# Patient Record
Sex: FEMALE | Race: WHITE | NOT HISPANIC OR LATINO | Employment: OTHER | URBAN - METROPOLITAN AREA
[De-identification: names, ages, dates, MRNs, and addresses within clinical notes are randomized per-mention and may not be internally consistent; named-entity substitution may affect disease eponyms.]

---

## 2017-05-27 ENCOUNTER — HOSPITAL ENCOUNTER (OUTPATIENT)
Dept: MRI IMAGING | Facility: CLINIC | Age: 37
Discharge: HOME | End: 2017-05-27
Attending: SPECIALIST | Admitting: SPECIALIST
Payer: COMMERCIAL

## 2017-05-27 DIAGNOSIS — E22.1 HYPERPROLACTINEMIA (H): ICD-10-CM

## 2017-05-27 PROCEDURE — 70553 MRI BRAIN STEM W/O & W/DYE: CPT

## 2017-05-27 PROCEDURE — 27210995 ZZH RX 272: Performed by: SPECIALIST

## 2017-05-27 PROCEDURE — A9585 GADOBUTROL INJECTION: HCPCS | Performed by: SPECIALIST

## 2017-05-27 PROCEDURE — 25000128 H RX IP 250 OP 636: Performed by: SPECIALIST

## 2017-05-27 RX ORDER — GADOBUTROL 604.72 MG/ML
10 INJECTION INTRAVENOUS ONCE
Status: COMPLETED | OUTPATIENT
Start: 2017-05-27 | End: 2017-05-27

## 2017-05-27 RX ORDER — ACYCLOVIR 200 MG/1
30 CAPSULE ORAL ONCE
Status: COMPLETED | OUTPATIENT
Start: 2017-05-27 | End: 2017-05-27

## 2017-05-27 RX ADMIN — GADOBUTROL 10 ML: 604.72 INJECTION INTRAVENOUS at 09:00

## 2017-05-27 RX ADMIN — SODIUM CHLORIDE 30 ML: 9 INJECTION, SOLUTION INTRAMUSCULAR; INTRAVENOUS; SUBCUTANEOUS at 09:00

## 2022-02-27 ENCOUNTER — HEALTH MAINTENANCE LETTER (OUTPATIENT)
Age: 42
End: 2022-02-27

## 2022-03-16 ENCOUNTER — TELEPHONE (OUTPATIENT)
Dept: ENDOCRINOLOGY | Facility: CLINIC | Age: 42
End: 2022-03-16
Payer: COMMERCIAL

## 2022-03-16 NOTE — TELEPHONE ENCOUNTER
Called informing pt to complete questionnaire on MyChart prior to visit.  Pt is scheduled with Dr Gordon at 2:00 pm     I called the patient and informed her that there will be a pre-visit check in 20-30 minutes prior

## 2022-03-17 ENCOUNTER — VIRTUAL VISIT (OUTPATIENT)
Dept: ENDOCRINOLOGY | Facility: CLINIC | Age: 42
End: 2022-03-17
Payer: COMMERCIAL

## 2022-03-17 VITALS — HEIGHT: 66 IN | BODY MASS INDEX: 34.87 KG/M2 | WEIGHT: 217 LBS

## 2022-03-17 DIAGNOSIS — F32.A DEPRESSION, UNSPECIFIED DEPRESSION TYPE: ICD-10-CM

## 2022-03-17 DIAGNOSIS — R63.4 WEIGHT LOSS: ICD-10-CM

## 2022-03-17 DIAGNOSIS — E66.9 OBESITY, UNSPECIFIED CLASSIFICATION, UNSPECIFIED OBESITY TYPE, UNSPECIFIED WHETHER SERIOUS COMORBIDITY PRESENT: Primary | ICD-10-CM

## 2022-03-17 PROCEDURE — 99204 OFFICE O/P NEW MOD 45 MIN: CPT | Mod: GT | Performed by: INTERNAL MEDICINE

## 2022-03-17 RX ORDER — BUPROPION HYDROCHLORIDE 150 MG/1
150 TABLET ORAL EVERY MORNING
Qty: 120 TABLET | Refills: 4 | Status: SHIPPED | OUTPATIENT
Start: 2022-03-17 | End: 2022-08-04

## 2022-03-17 RX ORDER — LEVOTHYROXINE SODIUM 50 UG/1
50 TABLET ORAL DAILY
COMMUNITY

## 2022-03-17 ASSESSMENT — PAIN SCALES - GENERAL: PAINLEVEL: NO PAIN (0)

## 2022-03-17 NOTE — PATIENT INSTRUCTIONS
A different type of gym to consider trying:  - titanium performance  - [email or web address]    Continue semaglutide 0.5mg for now    Buproprion: start 150mg per day in the morning for 2 weeks. If no noticeable benefit at that dose, then take 2 tablets (300mg) in the morning.     CONTRAVE (bupropion and naltrexone)    We are considering starting Contrave. Contrave is specifically prescribed for obesity. It is a combination of two medications, Naltrexone and Bupropione (Wellbutrin). The Bupropion helps lessen appetite and the Naltrexone works by blocking certain receptors in the brain and curbing cravings.      For some of our patients the medication works right away. Other patients don't feel much of a change but find they've lost weight. Like all weight loss medications, Contrave works best when you help it work. This means:  1. Having less tempting high calorie (fattening) food around the house or office. (For people with strong cravings this is very important.)   2. Staying away from situations or people that may trigger your cravings .   3. Eating out only one time or less each week.  4. Eating your meals at a table with the TV or computer off.    WARNING: This medication blocks the action of opioid type pain medications. If you routinely take any medication like Codeine, Oxycontin, Percocet, Morphine, Dilaudid or Methodone, do not take this until you have talked with weight management staff.     FYI- If you are planning on having an elective surgery, you should start titrating yourself off Contrave 4 weeks prior to surgery. This would entail decreasing the same way you started the medication, by taking one tablet less per week for 4 weeks, until you are able to stop the medication. If you need to stop it quicker, you can take 1 tablet in the morning and 1 tablet in the evening for 2 weeks, and then stop the medication. Please don't hesitate to call if you have any questions regarding this.    Dosing as  follows:  Week 1- 1 tablet in the morning  Week 2- 1 tablet in the morning and 1 tablet at bedtime  Week 3- 2 tablets in the morning and 1 tablet at bedtime  Week 4 and thereafter- 2 tablets in the morning and 2 tablets at bedtime    Side-effects: The most common side effect include: nausea; constipation; headache; vomiting; dizziness; diarrhea; trouble sleeping; and dry mouth.     This medication typically isn t covered by insurance and will require a prior authorization, which can take 1-2 weeks to review. Patients can visit [email or web address] and sign up for the Pharmacy savings program and receive the medication for $60-70 per month for 12 months if eligible.    For any questions or concerns please send a [employer] message to our team or call our weight management call center at [phone] during regular business hours. For questions during evenings or weekends your messages will be addressed during the next business day.  For emergencies please call 911 or seek immediate medical care.     (Do not stop taking it if you don't think it's working. For some people it works without them knowing it.)      In order to get refills of this or any medication we prescribe you must be seen in the medical weight mgmt clinic every 2-4 months. Please have your pharmacy fax a refill request to [phone].

## 2022-03-17 NOTE — NURSING NOTE
"Chief Complaint   Patient presents with     Consult      Consultation Weight Management       Vitals:    03/17/22 1353   Weight: 98.4 kg (217 lb)   Height: 1.664 m (5' 5.5\")       Body mass index is 35.56 kg/m .                       "

## 2022-03-17 NOTE — PROGRESS NOTES
Larisa is a 41 year old who is being evaluated via a billable video visit.      How would you like to obtain your AVS? MyChart  If the video visit is dropped, the invitation should be resent by: Text to cell phone: [phone]  Will anyone else be joining your video visit? No      Video Start Time: 2:14 PM  Video-Visit Details    Type of service:  Video Visit    Video End Time:3:02    Originating Location (pt. Location): Home    Distant Location (provider location):  [facility] [place]     Platform used for Video Visit: ????     [facility] Weight Management Consult    PATIENT:  Larisa Carrizales  MRN:         [id number]  :         1980  ADRIAN:         3/17/2022    Dear Ilya Mejias,    I had the pleasure of seeing your patient, Larisa Carrizales.  Full intake/assessment done to determine barriers to weight loss success and develop a treatment plan.  Larisa Carrizales is a 41 year old female interested in treatment of medical problems associated with weight.  Her weight today is 217 lbs 0 oz, Body mass index is 35.56 kg/m ., and she has the following co-morbidities:     3/16/2022   I have the following health issues associated with obesity: Hypothyroidism   I have the following symptoms associated with obesity: Depression, Fatigue     Goals for coming here: to be healthier  Weight history: had her son in  and didn't feel well after for 10 months. Found to have pituitary adenoma. Weight 170 after he was born, then steady up to 233 after that. Now down to 216 in 10 weeks.   Past experiences with weight loss: started semaglutide  In 2021 and has noticed a significant reduction in thinking about food and not hungry. Also had elevated TSH.   Typical day schedule: gets kids up at 645am.  Sleep: Goes to bed at 11:00/11:30, falls sleep at depends,   -- does always feel tired, but getting better with thyroid treatment  Snores: not really   Typical food: Breakfast:  "Cottage cheese, coffee, sometimes skips breakfast, Snack: --, Lunch: light sandwich; or salad/veggies; or out for lunch, Snack: popped chips, Dinner: chicken, veggie, salad, potatoes, eating after dinner: doesn't eat after dinner since start semaglutide.  - has fast food just once per week   - eats more unhealthy foods on the weekend  Periods of hunger during the day: Ozempic took it away  Typical snacks: yogurt  Typical beverages: coffee, water, diet coke rarely, soda   Who lives at home:  and 2 kids  Are they supportive: yes.  does time-restricted and exercises 5 days/week  Who does the shopping: both, and the cooking: both.   Mood: good  Mental Health History Reviewed With Patient 3/16/2022   Have you ever been physically or sexually abused? No   If yes, do you feel that the abuse is affecting your weight? N/A   If yes, would you like to talk to a counselor about the abuse? N/A   How often in the past 2 weeks have you felt little interest or pleasure in doing things? For Several Days   Over the past 2 weeks how often have you felt down, depressed, or hopeless? Not at all     Exercise: doesn't like going to the gym.     Patient Goals 3/16/2022   I am interested in having a healthier weight to diminish current health problems: Yes   I am interested in having a healthier weight in order to prevent future health problems: Yes   I am interested in having a healthier weight in order to have a future surgery: No       Referring Provider 3/16/2022   Please name the provider who referred you to Medical Weight Management.  If you do not know, please answer: \"I Don't Know\". Tavo Arriaga       Wt Readings from Last 4 Encounters:   03/17/22 98.4 kg (217 lb)   10/11/14 106.6 kg (235 lb 0.2 oz)   04/09/14 101.2 kg (223 lb)   02/18/14 106.1 kg (234 lb)       Weight History 3/16/2022   How concerned are you about your weight? Somewhat Concerned   Would you describe your weight gain as gradual? Yes   I became " overweight: After Pregnancy   The following factors have contributed to my weight gain:  Mental Health Issues, Eating Too Much, Lack of Exercise, Genetic (Runs in the Family)   I have tried the following methods to lose weight: Watching Portions or Calories, Exercise   My lowest weight since age 18 was: 140   My highest weight since age 18 was: 233   The most weight I have ever lost was: (lbs) 25   Has anyone in your family had weight loss surgery? No   How has your weight changed over the last year?  Gained   How many pounds? 15       Diet Recall 3/16/2022   Glass juice/day 0   Glass milk/day 0   Glass sugary drink/day 0   How many cans/bottles of sugar pop/soda/tea/sports drinks do you drink in a day? 0   Diet drink/day 0   Alcohol Never       Eating Habits 3/16/2022   Generally, my meals include foods like these: bread, pasta, rice, potatoes, corn, crackers, sweet dessert, pop, or juice. Once a Week   Generally, my meals include foods like these: fried meats, brats, burgers, french fries, pizza, cheese, chips, or ice cream. Less Than Weekly   Eat fast food (like McDonalds, [employer], Taco Bell). Less Than Weekly   Eat at a buffet or sit-down restaurant. Less Than Weekly   Eat most of my meals in front of the TV or computer. Never   Often skip meals, eat at random times, have no regular eating times. Less Than Weekly   Rarely sit down for a meal but snack or graze throughout.  Never   Eat extra snacks between meals. Less Than Weekly   Eat most of my food at the end of the day. Never   Eat in the middle of the night or wake up at night to eat. Never   Eat extra snacks to prevent or correct low blood sugar. Less Than Weekly   Eat to prevent acid reflux or stomach pain. Never   Worry about not having enough food to eat. Never   Have you been to the food shelf at least a few times this year? No   I eat when I am depressed. Never   I eat when I am stressed. Never   I eat when I am bored. Once a Week   I eat when I am  anxious. Never   I eat when I am happy or as a reward. Less Than Weekly   I feel hungry all the time even if I just have eaten. Never   Feeling full is important to me. Less Than Weekly   I finish all the food on my plate even if I am already full. Less Than Weekly   I can't resist eating delicious food or walk past the good food/smell. Less Than Weekly   I eat/snack without noticing that I am eating. Less Than Weekly   I eat when I am preparing the meal. Less Than Weekly   I eat more than usual when I see others eating. Less Than Weekly   I have trouble not eating sweets, ice cream, cookies, or chips if they are around the house. Less Than Weekly   I think about food all day. Never   What foods, if any, do you crave? Chips/Crackers       Activity/Exercise History 3/16/2022   How much of a typical 12 hour day do you spend sitting? Half the Day   How much of a typical 12 hour day do you spend lying down? Less Than Half the Day   How much of a typical day do you spend walking/standing? Half the Day   How many hours (not including work) do you spend on the TV/Video Games/Computer/Tablet/Phone? 4-5 Hours   How many times a week are you active for the purpose of exercise? Once a Week   What keeps you from being more active? Too tired, Other   How many total minutes do you spend doing some activity for the purpose of exercising when you exercise? More Than 30 Minutes     PAST MEDICAL HISTORY:  No flowsheet data found.  Past Medical History:   Diagnosis Date     Anxiety      Depression      Environmental allergies      Pituitary adenoma (H) 11/10     Vitamin D deficiency      Menstrual History: regular recently    Work/Social History Reviewed With Patient 3/16/2022   My employment status is: Part-Time   My job is: Social work   How much of your job is spent on the computer or phone? 75%   How many hours do you spend commuting to work daily?  0   What is your marital status? /In a Relationship   If in a relationship,  "is your significant other overweight? No   Do you have children? Yes   If you have children, are they overweight? No   Who do you live with?   and two kids   Are they supportive of your health goals? Yes   Who does the food shopping?  Myself     Sleep History Reviewed With Patient 3/16/2022   How many hours do you sleep at night? 7   Do you think that you snore loudly or has anybody ever heard you snore loudly (louder than talking or so loud it can be heard behind a shut door)? No   Has anyone seen or heard you stop breathing during your sleep? No   Do you often feel tired, fatigued, or sleepy during the day? Yes   Do you have a TV/Computer in your bedroom? No       MEDICATIONS:   Current Outpatient Medications   Medication Sig Dispense Refill     bromocriptine (PARLODEL) 2.5 MG tablet Take 3.75 mg by mouth daily (1.5 tablets of the 2.5mg strength.)       buPROPion (WELLBUTRIN XL) 150 MG 24 hr tablet Take 1 tablet (150 mg) by mouth every morning Can increase to 300mg after 2 weeks if no effect 150mg. 120 tablet 4     levothyroxine (SYNTHROID/LEVOTHROID) 50 MCG tablet Take 50 mcg by mouth daily       Pediatric Multivitamins-Fl (MULTIVITAMIN WITH 1 MG FLUORIDE) 1 MG CHEW Take 1 tablet by mouth daily       semaglutide (OZEMPIC) 2 MG/1.5ML SOPN pen Inject 0.5 mg Subcutaneous once a week 1.5 mL 4     vitamin D (ERGOCALCIFEROL) 28461 UNIT capsule Take 50,000 Units by mouth every 7 days Every Monday       ALLERGIES:   No Known Allergies    PHYSICAL EXAM:  Ht 1.664 m (5' 5.5\")   Wt 98.4 kg (217 lb)   BMI 35.56 kg/m     A & O x 3  HEENT: NCAT, mucous membranes moist    LABS:  No results found for: A1C  No results found for: CHOL  No results found for: TSH  Creatinine   Date Value Ref Range Status   10/08/2014 0.73 0.52 - 1.04 mg/dL Final     No results found for: ALT    ASSESSMENT:  Ms. Carrizales is a 41 year old female with history of The primary encounter diagnosis was Obesity, unspecified classification, unspecified " obesity type, unspecified whether serious comorbidity present. Diagnoses of Weight loss and Depression, unspecified depression type were also pertinent to this visit. and Class 2 obesity with current body mass index is 35.56 kg/m . and with current health consequences who presents for an initial weight management consultation.       PLAN:    Problem   Obesity, Unspecified Classification, Unspecified Obesity Type, Unspecified Whether Serious Comorbidity Present    March 2022: My first time meeting Ms Carrizales. We discussed starting bupropion given its beneficial effects for weight management. She will also continue semaglutide 0.5mg weekly, and then can increase to 1.0mg weekly when the weight-loss effect of 0.5mg weekly wears off. Her lifestyle is overall very good. Given her age over 40, we discussed adding in more structured strength training when it works for her.           With motivational interviewing, Larisa took part in making the following plan for their Class 2 Obesity:   Patient Instructions   A different type of gym to consider trying:  - titanium performance  - [email or web address]    Continue semaglutide 0.5mg for now    Buproprion: start 150mg per day in the morning for 2 weeks. If no noticeable benefit at that dose, then take 2 tablets (300mg) in the morning.     CONTRAVE (bupropion and naltrexone)    We are considering starting Contrave. Contrave is specifically prescribed for obesity. It is a combination of two medications, Naltrexone and Bupropione (Wellbutrin). The Bupropion helps lessen appetite and the Naltrexone works by blocking certain receptors in the brain and curbing cravings.      For some of our patients the medication works right away. Other patients don't feel much of a change but find they've lost weight. Like all weight loss medications, Contrave works best when you help it work. This means:  1. Having less tempting high calorie (fattening) food around the house or office. (For  people with strong cravings this is very important.)   2. Staying away from situations or people that may trigger your cravings .   3. Eating out only one time or less each week.  4. Eating your meals at a table with the TV or computer off.    WARNING: This medication blocks the action of opioid type pain medications. If you routinely take any medication like Codeine, Oxycontin, Percocet, Morphine, Dilaudid or Methodone, do not take this until you have talked with weight management staff.     FYI- If you are planning on having an elective surgery, you should start titrating yourself off Contrave 4 weeks prior to surgery. This would entail decreasing the same way you started the medication, by taking one tablet less per week for 4 weeks, until you are able to stop the medication. If you need to stop it quicker, you can take 1 tablet in the morning and 1 tablet in the evening for 2 weeks, and then stop the medication. Please don't hesitate to call if you have any questions regarding this.    Dosing as follows:  Week 1- 1 tablet in the morning  Week 2- 1 tablet in the morning and 1 tablet at bedtime  Week 3- 2 tablets in the morning and 1 tablet at bedtime  Week 4 and thereafter- 2 tablets in the morning and 2 tablets at bedtime    Side-effects: The most common side effect include: nausea; constipation; headache; vomiting; dizziness; diarrhea; trouble sleeping; and dry mouth.     This medication typically isn t covered by insurance and will require a prior authorization, which can take 1-2 weeks to review. Patients can visit [email or web address] and sign up for the Pharmacy savings program and receive the medication for $60-70 per month for 12 months if eligible.    For any questions or concerns please send a [employer] message to our team or call our weight management call center at [phone] during regular business hours. For questions during evenings or weekends your messages will be addressed during the next business  day.  For emergencies please call 911 or seek immediate medical care.     (Do not stop taking it if you don't think it's working. For some people it works without them knowing it.)      In order to get refills of this or any medication we prescribe you must be seen in the medical weight mgmt clinic every 2-4 months. Please have your pharmacy fax a refill request to [phone].                                               RTC:    8 weeks.    TIME: 40 min spent on evaluation, management, counseling, education, & motivational interviewing with greater than 50 % of the total time was spent on counseling and coordinating care    Sincerely,    Linda Gordon MD  Diplomat, [employer]

## 2022-03-17 NOTE — LETTER
3/17/2022       RE: Larisa Carrizales  [address]  [facility] [address]     Dear Colleague,    Thank you for referring your patient, Larisa Carrizales, to the [facility] WEIGHT MANAGEMENT CLINIC [place] at [facility]. Please see a copy of my visit note below.    Larisa is a 41 year old who is being evaluated via a billable video visit.      How would you like to obtain your AVS? MyChart  If the video visit is dropped, the invitation should be resent by: Text to cell phone: [phone]  Will anyone else be joining your video visit? No      Video Start Time: 2:14 PM  Video-Visit Details    Type of service:  Video Visit    Video End Time:3:02    Originating Location (pt. Location): Home    Distant Location (provider location):  [facility] WEIGHT MANAGEMENT CLINIC [place]     Platform used for Video Visit: Terare[employer]     [facility] Weight Management Consult    PATIENT:  Larisa Carrizales  MRN:         [id number]  :         1980  ADRIAN:         3/17/2022    Dear Ilya Mejias,    I had the pleasure of seeing your patient, Larisa Carrizales.  Full intake/assessment done to determine barriers to weight loss success and develop a treatment plan.  Larisa Carrizales is a 41 year old female interested in treatment of medical problems associated with weight.  Her weight today is 217 lbs 0 oz, Body mass index is 35.56 kg/m ., and she has the following co-morbidities:     3/16/2022   I have the following health issues associated with obesity: Hypothyroidism   I have the following symptoms associated with obesity: Depression, Fatigue     Goals for coming here: to be healthier  Weight history: had her son in  and didn't feel well after for 10 months. Found to have pituitary adenoma. Weight 170 after he was born, then steady up to 233 after that. Now down to 216 in 10 weeks.   Past experiences with weight loss: started semaglutide  In 2021  "and has noticed a significant reduction in thinking about food and not hungry. Also had elevated TSH.   Typical day schedule: gets kids up at 645am.  Sleep: Goes to bed at 11:00/11:30, falls sleep at depends,   -- does always feel tired, but getting better with thyroid treatment  Snores: not really   Typical food: Breakfast: Cottage cheese, coffee, sometimes skips breakfast, Snack: --, Lunch: light sandwich; or salad/veggies; or out for lunch, Snack: popped chips, Dinner: chicken, veggie, salad, potatoes, eating after dinner: doesn't eat after dinner since start semaglutide.  - has fast food just once per week   - eats more unhealthy foods on the weekend  Periods of hunger during the day: Ozempic took it away  Typical snacks: yogurt  Typical beverages: coffee, water, diet coke rarely, soda   Who lives at home:  and 2 kids  Are they supportive: yes.  does time-restricted and exercises 5 days/week  Who does the shopping: both, and the cooking: both.   Mood: good  Mental Health History Reviewed With Patient 3/16/2022   Have you ever been physically or sexually abused? No   If yes, do you feel that the abuse is affecting your weight? N/A   If yes, would you like to talk to a counselor about the abuse? N/A   How often in the past 2 weeks have you felt little interest or pleasure in doing things? For Several Days   Over the past 2 weeks how often have you felt down, depressed, or hopeless? Not at all     Exercise: doesn't like going to the gym.     Patient Goals 3/16/2022   I am interested in having a healthier weight to diminish current health problems: Yes   I am interested in having a healthier weight in order to prevent future health problems: Yes   I am interested in having a healthier weight in order to have a future surgery: No       Referring Provider 3/16/2022   Please name the provider who referred you to Medical Weight Management.  If you do not know, please answer: \"I Don't Know\". Tavo Arriaga "       Wt Readings from Last 4 Encounters:   03/17/22 98.4 kg (217 lb)   10/11/14 106.6 kg (235 lb 0.2 oz)   04/09/14 101.2 kg (223 lb)   02/18/14 106.1 kg (234 lb)       Weight History 3/16/2022   How concerned are you about your weight? Somewhat Concerned   Would you describe your weight gain as gradual? Yes   I became overweight: After Pregnancy   The following factors have contributed to my weight gain:  Mental Health Issues, Eating Too Much, Lack of Exercise, Genetic (Runs in the Family)   I have tried the following methods to lose weight: Watching Portions or Calories, Exercise   My lowest weight since age 18 was: 140   My highest weight since age 18 was: 233   The most weight I have ever lost was: (lbs) 25   Has anyone in your family had weight loss surgery? No   How has your weight changed over the last year?  Gained   How many pounds? 15       Diet Recall 3/16/2022   Glass juice/day 0   Glass milk/day 0   Glass sugary drink/day 0   How many cans/bottles of sugar pop/soda/tea/sports drinks do you drink in a day? 0   Diet drink/day 0   Alcohol Never       Eating Habits 3/16/2022   Generally, my meals include foods like these: bread, pasta, rice, potatoes, corn, crackers, sweet dessert, pop, or juice. Once a Week   Generally, my meals include foods like these: fried meats, brats, burgers, french fries, pizza, cheese, chips, or ice cream. Less Than Weekly   Eat fast food (like Smart Plates, YouTab, Taco Bell). Less Than Weekly   Eat at a buffet or sit-down restaurant. Less Than Weekly   Eat most of my meals in front of the TV or computer. Never   Often skip meals, eat at random times, have no regular eating times. Less Than Weekly   Rarely sit down for a meal but snack or graze throughout.  Never   Eat extra snacks between meals. Less Than Weekly   Eat most of my food at the end of the day. Never   Eat in the middle of the night or wake up at night to eat. Never   Eat extra snacks to prevent or correct low  blood sugar. Less Than Weekly   Eat to prevent acid reflux or stomach pain. Never   Worry about not having enough food to eat. Never   Have you been to the food shelf at least a few times this year? No   I eat when I am depressed. Never   I eat when I am stressed. Never   I eat when I am bored. Once a Week   I eat when I am anxious. Never   I eat when I am happy or as a reward. Less Than Weekly   I feel hungry all the time even if I just have eaten. Never   Feeling full is important to me. Less Than Weekly   I finish all the food on my plate even if I am already full. Less Than Weekly   I can't resist eating delicious food or walk past the good food/smell. Less Than Weekly   I eat/snack without noticing that I am eating. Less Than Weekly   I eat when I am preparing the meal. Less Than Weekly   I eat more than usual when I see others eating. Less Than Weekly   I have trouble not eating sweets, ice cream, cookies, or chips if they are around the house. Less Than Weekly   I think about food all day. Never   What foods, if any, do you crave? Chips/Crackers       Activity/Exercise History 3/16/2022   How much of a typical 12 hour day do you spend sitting? Half the Day   How much of a typical 12 hour day do you spend lying down? Less Than Half the Day   How much of a typical day do you spend walking/standing? Half the Day   How many hours (not including work) do you spend on the TV/Video Games/Computer/Tablet/Phone? 4-5 Hours   How many times a week are you active for the purpose of exercise? Once a Week   What keeps you from being more active? Too tired, Other   How many total minutes do you spend doing some activity for the purpose of exercising when you exercise? More Than 30 Minutes     PAST MEDICAL HISTORY:  No flowsheet data found.  Past Medical History:   Diagnosis Date     Anxiety      Depression      Environmental allergies      Pituitary adenoma (H) 11/10     Vitamin D deficiency      Menstrual History: regular  "recently    Work/Social History Reviewed With Patient 3/16/2022   My employment status is: Part-Time   My job is: Social work   How much of your job is spent on the computer or phone? 75%   How many hours do you spend commuting to work daily?  0   What is your marital status? /In a Relationship   If in a relationship, is your significant other overweight? No   Do you have children? Yes   If you have children, are they overweight? No   Who do you live with?   and two kids   Are they supportive of your health goals? Yes   Who does the food shopping?  Myself     Sleep History Reviewed With Patient 3/16/2022   How many hours do you sleep at night? 7   Do you think that you snore loudly or has anybody ever heard you snore loudly (louder than talking or so loud it can be heard behind a shut door)? No   Has anyone seen or heard you stop breathing during your sleep? No   Do you often feel tired, fatigued, or sleepy during the day? Yes   Do you have a TV/Computer in your bedroom? No       MEDICATIONS:   Current Outpatient Medications   Medication Sig Dispense Refill     bromocriptine (PARLODEL) 2.5 MG tablet Take 3.75 mg by mouth daily (1.5 tablets of the 2.5mg strength.)       buPROPion (WELLBUTRIN XL) 150 MG 24 hr tablet Take 1 tablet (150 mg) by mouth every morning Can increase to 300mg after 2 weeks if no effect 150mg. 120 tablet 4     levothyroxine (SYNTHROID/LEVOTHROID) 50 MCG tablet Take 50 mcg by mouth daily       Pediatric Multivitamins-Fl (MULTIVITAMIN WITH 1 MG FLUORIDE) 1 MG CHEW Take 1 tablet by mouth daily       semaglutide (OZEMPIC) 2 MG/1.5ML SOPN pen Inject 0.5 mg Subcutaneous once a week 1.5 mL 4     vitamin D (ERGOCALCIFEROL) 02929 UNIT capsule Take 50,000 Units by mouth every 7 days Every Monday       ALLERGIES:   No Known Allergies    PHYSICAL EXAM:  Ht 1.664 m (5' 5.5\")   Wt 98.4 kg (217 lb)   BMI 35.56 kg/m     A & O x 3  HEENT: NCAT, mucous membranes moist    LABS:  No results found " for: A1C  No results found for: CHOL  No results found for: TSH  Creatinine   Date Value Ref Range Status   10/08/2014 0.73 0.52 - 1.04 mg/dL Final     No results found for: ALT    ASSESSMENT:  Ms. Carrizales is a 41 year old female with history of The primary encounter diagnosis was Obesity, unspecified classification, unspecified obesity type, unspecified whether serious comorbidity present. Diagnoses of Weight loss and Depression, unspecified depression type were also pertinent to this visit. and Class 2 obesity with current body mass index is 35.56 kg/m . and with current health consequences who presents for an initial weight management consultation.       PLAN:    Problem   Obesity, Unspecified Classification, Unspecified Obesity Type, Unspecified Whether Serious Comorbidity Present    March 2022: My first time meeting Ms Carrizales. We discussed starting bupropion given its beneficial effects for weight management. She will also continue semaglutide 0.5mg weekly, and then can increase to 1.0mg weekly when the weight-loss effect of 0.5mg weekly wears off. Her lifestyle is overall very good. Given her age over 40, we discussed adding in more structured strength training when it works for her.           With motivational interviewing, Larisa took part in making the following plan for their Class 2 Obesity:   Patient Instructions   A different type of gym to consider trying:  - titanium performance  - [email or web address]-strength.[email or web address]    Continue semaglutide 0.5mg for now    Buproprion: start 150mg per day in the morning for 2 weeks. If no noticeable benefit at that dose, then take 2 tablets (300mg) in the morning.     CONTRAVE (bupropion and naltrexone)    We are considering starting Contrave. Contrave is specifically prescribed for obesity. It is a combination of two medications, Naltrexone and Bupropione (Wellbutrin). The Bupropion helps lessen appetite and the Naltrexone works by blocking certain receptors in the brain and  curbing cravings.      For some of our patients the medication works right away. Other patients don't feel much of a change but find they've lost weight. Like all weight loss medications, Contrave works best when you help it work. This means:  1. Having less tempting high calorie (fattening) food around the house or office. (For people with strong cravings this is very important.)   2. Staying away from situations or people that may trigger your cravings .   3. Eating out only one time or less each week.  4. Eating your meals at a table with the TV or computer off.    WARNING: This medication blocks the action of opioid type pain medications. If you routinely take any medication like Codeine, Oxycontin, Percocet, Morphine, Dilaudid or Methodone, do not take this until you have talked with weight management staff.     FYI- If you are planning on having an elective surgery, you should start titrating yourself off Contrave 4 weeks prior to surgery. This would entail decreasing the same way you started the medication, by taking one tablet less per week for 4 weeks, until you are able to stop the medication. If you need to stop it quicker, you can take 1 tablet in the morning and 1 tablet in the evening for 2 weeks, and then stop the medication. Please don't hesitate to call if you have any questions regarding this.    Dosing as follows:  Week 1- 1 tablet in the morning  Week 2- 1 tablet in the morning and 1 tablet at bedtime  Week 3- 2 tablets in the morning and 1 tablet at bedtime  Week 4 and thereafter- 2 tablets in the morning and 2 tablets at bedtime    Side-effects: The most common side effect include: nausea; constipation; headache; vomiting; dizziness; diarrhea; trouble sleeping; and dry mouth.     This medication typically isn t covered by insurance and will require a prior authorization, which can take 1-2 weeks to review. Patients can visit www.[email or web address].com and sign up for the Pharmacy savings program and  receive the medication for $60-70 per month for 12 months if eligible.    For any questions or concerns please send a [employer] message to our team or call our weight management call center at [phone] during regular business hours. For questions during evenings or weekends your messages will be addressed during the next business day.  For emergencies please call 911 or seek immediate medical care.     (Do not stop taking it if you don't think it's working. For some people it works without them knowing it.)      In order to get refills of this or any medication we prescribe you must be seen in the medical weight mgmt clinic every 2-4 months. Please have your pharmacy fax a refill request to [phone].                                               RTC:    8 weeks.    TIME: 40 min spent on evaluation, management, counseling, education, & motivational interviewing with greater than 50 % of the total time was spent on counseling and coordinating care    Sincerely,    Linda Gordon MD  Diplomat, American Board of Obesity Medicine

## 2022-03-24 ENCOUNTER — TELEPHONE (OUTPATIENT)
Dept: ENDOCRINOLOGY | Facility: CLINIC | Age: 42
End: 2022-03-24
Payer: COMMERCIAL

## 2022-04-15 PROBLEM — E66.9 OBESITY, UNSPECIFIED CLASSIFICATION, UNSPECIFIED OBESITY TYPE, UNSPECIFIED WHETHER SERIOUS COMORBIDITY PRESENT: Status: ACTIVE | Noted: 2022-04-15

## 2022-05-10 DIAGNOSIS — E66.9 OBESITY, UNSPECIFIED CLASSIFICATION, UNSPECIFIED OBESITY TYPE, UNSPECIFIED WHETHER SERIOUS COMORBIDITY PRESENT: Primary | ICD-10-CM

## 2022-05-10 NOTE — TELEPHONE ENCOUNTER
Pt would like to increase the dose for her Ozempic, she has upcoming appt in Aug but does not want to wait that long    [phone] / ok to leave a detailed msg

## 2022-05-11 RX ORDER — SEMAGLUTIDE 1.34 MG/ML
1 INJECTION, SOLUTION SUBCUTANEOUS WEEKLY
Qty: 9 ML | Refills: 1 | Status: SHIPPED | OUTPATIENT
Start: 2022-05-11 | End: 2022-08-04

## 2022-05-11 RX ORDER — SEMAGLUTIDE 1.34 MG/ML
1 INJECTION, SOLUTION SUBCUTANEOUS WEEKLY
Qty: 3 ML | Refills: 3 | Status: SHIPPED | OUTPATIENT
Start: 2022-05-11 | End: 2022-05-11

## 2022-05-11 NOTE — TELEPHONE ENCOUNTER
Patient requesting 90 day supply, as her insurance will be changing next month. Re-routed to available provider for sign off.

## 2022-05-11 NOTE — TELEPHONE ENCOUNTER
Authorized dose increase by Iesha Pitts PA-C. Dr. Gordon's note from last visit states the following:    March 2022: My first time meeting Ms Carrizales. We discussed starting bupropion given its beneficial effects for weight management. She will also continue semaglutide 0.5mg weekly, and then can increase to 1.0mg weekly when the weight-loss effect of 0.5mg weekly wears off. Her lifestyle is overall very good. Given her age over 40, we discussed adding in more structured strength training when it works for her.

## 2022-05-27 ENCOUNTER — HOSPITAL ENCOUNTER (OUTPATIENT)
Dept: MRI IMAGING | Facility: CLINIC | Age: 42
Discharge: HOME | End: 2022-05-27
Attending: INTERNAL MEDICINE | Admitting: INTERNAL MEDICINE
Payer: COMMERCIAL

## 2022-05-27 DIAGNOSIS — E23.7 PITUITARY DISEASE (H): ICD-10-CM

## 2022-05-27 PROCEDURE — 70543 MRI ORBT/FAC/NCK W/O &W/DYE: CPT

## 2022-05-27 PROCEDURE — A9585 GADOBUTROL INJECTION: HCPCS | Performed by: INTERNAL MEDICINE

## 2022-05-27 PROCEDURE — 255N000002 HC RX 255 OP 636: Performed by: INTERNAL MEDICINE

## 2022-05-27 RX ORDER — GADOBUTROL 604.72 MG/ML
10 INJECTION INTRAVENOUS ONCE
Status: COMPLETED | OUTPATIENT
Start: 2022-05-27 | End: 2022-05-27

## 2022-05-27 RX ADMIN — GADOBUTROL 10 ML: 604.72 INJECTION INTRAVENOUS at 07:33

## 2022-08-04 ENCOUNTER — VIRTUAL VISIT (OUTPATIENT)
Dept: ENDOCRINOLOGY | Facility: CLINIC | Age: 42
End: 2022-08-04
Payer: COMMERCIAL

## 2022-08-04 VITALS — BODY MASS INDEX: 36.11 KG/M2 | HEIGHT: 65 IN

## 2022-08-04 DIAGNOSIS — F32.A DEPRESSION, UNSPECIFIED DEPRESSION TYPE: ICD-10-CM

## 2022-08-04 DIAGNOSIS — E66.9 OBESITY, UNSPECIFIED CLASSIFICATION, UNSPECIFIED OBESITY TYPE, UNSPECIFIED WHETHER SERIOUS COMORBIDITY PRESENT: ICD-10-CM

## 2022-08-04 PROCEDURE — 99213 OFFICE O/P EST LOW 20 MIN: CPT | Mod: GT | Performed by: INTERNAL MEDICINE

## 2022-08-04 RX ORDER — NORGESTIMATE AND ETHINYL ESTRADIOL 7DAYSX3 28
1 KIT ORAL DAILY
COMMUNITY
Start: 2022-05-25

## 2022-08-04 RX ORDER — METHYLPHENIDATE HYDROCHLORIDE 27 MG/1
27 TABLET ORAL
COMMUNITY
Start: 2022-07-26

## 2022-08-04 ASSESSMENT — PAIN SCALES - GENERAL: PAINLEVEL: NO PAIN (0)

## 2022-08-04 NOTE — LETTER
"2022       RE: Larisa Carrizales  [address]  [facility] [address]     Dear Colleague,    Thank you for referring your patient, Larisa Carrizales, to the [facility] WEIGHT MANAGEMENT CLINIC [place] at [facility]. Please see a copy of my visit note below.    Return Medical Weight Management Note  Larisa Carrizales  MRN:  [id number]  :  1980  ADRIAN:  2022    Dear Ilya Mejias,    I had the pleasure of seeing your patient Larisa Carrizales for follow-up consultation.  She is a 42 year old female who I am continuing to see for treatment of obesity related to:       3/16/2022   I have the following health issues associated with obesity: Hypothyroidism   I have the following symptoms associated with obesity: Depression, Fatigue     INTERVAL HISTORY:    CURRENT WEIGHT:   0 lbs 0 oz    Wt Readings from Last 4 Encounters:   22 98.4 kg (217 lb)   10/11/14 106.6 kg (235 lb 0.2 oz)   14 101.2 kg (223 lb)   14 106.1 kg (234 lb)     Height:  5' 5\"  Body Mass Index:  Body mass index is 36.11 kg/m .  Vitals:  B/P: Data Unavailable, P: Data Unavailable, R: Data Unavailable     Starting weight in weight management with Dr. Gordon was: 217 in 3/2022    Diet Recall Review with Patient 3/16/2022   Do you typically eat breakfast? No   Do you typically eat lunch? Yes   If you do eat lunch, what types of food do you typically eat?  Salads, wraps, grain bowls   Do you typically eat supper? Yes   If you do eat supper, what types of food do you typically eat? Chicken, potatoes, salad, veggies   Do you typically eat snacks? Yes   If you do snack, what types of food do you typically eat? Almonds, corn chips, cottage cheese   Do you like vegetables?  Yes   Do you drink water? Yes   How many glasses of juice do you drink in a typical day? 0   How many of glasses of milk do you drink in a typical day? 0   If you do drink milk, what type? N/A   How " many 8oz glasses of sugar containing drinks such as Schuyler-Aid/sweet tea do you drink in a day? 0   How many cans/bottles of sugar pop/soda/tea/sports drinks do you drink in a day? 0   How many cans/bottles of diet pop/soda/tea or sports drink do you drink in a day? 0   How often do you have a drink of alcohol? Never       MEDICATIONS:   Current Outpatient Medications   Medication     bromocriptine (PARLODEL) 2.5 MG tablet     buPROPion (WELLBUTRIN XL) 150 MG 24 hr tablet     levothyroxine (SYNTHROID/LEVOTHROID) 50 MCG tablet     methylphenidate (CONCERTA) 27 MG CR tablet     norgestim-eth estrad triphasic (ORTHO TRI-CYCLEN) 0.18/0.215/0.25 MG-35 MCG tablet     Pediatric Multivitamins-Fl (MULTIVITAMIN WITH 1 MG FLUORIDE) 1 MG CHEW     Semaglutide, 1 MG/DOSE, (OZEMPIC, 1 MG/DOSE,) 4 MG/3ML SOPN     vitamin D (ERGOCALCIFEROL) 86682 UNIT capsule     No current facility-administered medications for this visit.       Weight Loss Medication History Reviewed With Patient 8/3/2022   Which weight loss medications are you currently taking on a regular basis?  Ozempic, Bupropion   Are you having any side effects from the weight loss medication that we have prescribed you? No       LABS:  No results found for: A1C  No results found for: CHOL  No results found for: TSH  Creatinine   Date Value Ref Range Status   10/08/2014 0.73 0.52 - 1.04 mg/dL Final     No results found for: ALT    ASSESSMENT:  Ms. Carrizales is a 42 year old female with history of Class 3 obesity with current body mass index is 36.11 kg/m . and with current health consequences who presents for weight management follow-up consultation. Overall Larisa Carrizales is doing very well.     The following diagnoses are relevant to Larisa Carrizales's history of obesity:     PLAN:    Problem   Obesity, Unspecified Classification, Unspecified Obesity Type, Unspecified Whether Serious Comorbidity Present    March 2022: My first time meeting Ms Carrizales. We discussed starting  bupropion given its beneficial effects for weight management. She will also continue semaglutide 0.5mg weekly, and then can increase to 1.0mg weekly when the weight-loss effect of 0.5mg weekly wears off. Her lifestyle is overall very good. Given her age over 40, we discussed adding in more structured strength training when it works for her.     Aug 2022: Is down from 233 in Jan 2022 to 197 today. Ozempic is helping significantly.  Semaglutide: continue semaglutide; though soon this will be cost-prohibitive.    Bupropion: is down to 150mg per day because recently diagnosed with ADHD and is now on concerta.         No problem-specific Assessment & Plan notes found for this encounter.      No orders of the defined types were placed in this encounter.       With motivational interviewing, Larisa took part in making the following plan:  Patient Instructions   Congrats on 36 pounds, 15% weight loss.     Agree with continuing     Follow-up: in 3 months       FOLLOW-UP:    12 weeks.    20 minutes spent on the date of the encounter doing chart review, history and exam, documentation and further activities as noted above.    Thank you for including me in the care of your patient.  Please do not hesitate to call with questions or concerns.    Sincerely,    Linda Gordon MD MPH  Diplomate, American Board of Obesity Medicine, American Board of Internal Medicine, American Board of Pediatrics    Departments of Internal Medicine and Pediatrics  [facility]        [unfilled]       Larisa is a 42 year old who is being evaluated via a billable video visit.      How would you like to obtain your AVS? MyChart  If the video visit is dropped, the invitation should be resent by: [phone]  Will anyone else be joining your video visit? No    During this virtual visit the patient is located in [place], patient verifies this as the location during the entirety of this visit.     Video-Visit  Details    Video Start Time: 2:47 PM    Type of service:  Video Visit    Video End Time:3:13 PM    Originating Location (pt. Location): Home    Distant Location (provider location):  [facility] WEIGHT MANAGEMENT CLINIC [place]     Platform used for Video Visit: Dianwoba

## 2022-08-04 NOTE — NURSING NOTE
"(   Chief Complaint   Patient presents with     RECHECK     Return MW    )    ( Weight:  (Patient denies weight) )  ( Height: 165.1 cm (5' 5\") )  (   )  (   )  (   )  (   )  (   )  (   )  (   )    (   )  (   )  (   )  (   )  (   )  (   )  (   )    (   Patient Active Problem List   Diagnosis     Indication for care in labor or delivery     Fall     Pregnancy     Rectus diastasis     Obesity, unspecified classification, unspecified obesity type, unspecified whether serious comorbidity present    )  (   Current Outpatient Medications   Medication Sig Dispense Refill     bromocriptine (PARLODEL) 2.5 MG tablet Take 3.75 mg by mouth daily (1.5 tablets of the 2.5mg strength.)       buPROPion (WELLBUTRIN XL) 150 MG 24 hr tablet Take 1 tablet (150 mg) by mouth every morning Can increase to 300mg after 2 weeks if no effect 150mg. 120 tablet 4     levothyroxine (SYNTHROID/LEVOTHROID) 50 MCG tablet Take 50 mcg by mouth daily       methylphenidate (CONCERTA) 27 MG CR tablet Take 27 mg by mouth       norgestim-eth estrad triphasic (ORTHO TRI-CYCLEN) 0.18/0.215/0.25 MG-35 MCG tablet Take 1 tablet by mouth daily       Pediatric Multivitamins-Fl (MULTIVITAMIN WITH 1 MG FLUORIDE) 1 MG CHEW Take 1 tablet by mouth daily       Semaglutide, 1 MG/DOSE, (OZEMPIC, 1 MG/DOSE,) 4 MG/3ML SOPN Inject 1 mg Subcutaneous once a week 9 mL 1     vitamin D (ERGOCALCIFEROL) 58524 UNIT capsule Take 50,000 Units by mouth every 7 days Every Monday      )  ( Diabetes Eval:    )    ( Pain Eval:  No Pain (0) )    ( Wound Eval:       )    (   History   Smoking Status     Never Smoker   Smokeless Tobacco     Never Used    )    ( Signed By:  Linh Seo, EMT; August 4, 2022; 2:12 PM )    "

## 2022-08-04 NOTE — PROGRESS NOTES
"Return Medical Weight Management Note  Larisa Carrizales  MRN:  [id number]  :  1980  ADRIAN:  2022    Dear Ilya Mejias,    I had the pleasure of seeing your patient Larisa Carrizales for follow-up consultation.  She is a 42 year old female who I am continuing to see for treatment of obesity related to:       3/16/2022   I have the following health issues associated with obesity: Hypothyroidism   I have the following symptoms associated with obesity: Depression, Fatigue     INTERVAL HISTORY:    CURRENT WEIGHT:   0 lbs 0 oz    Wt Readings from Last 4 Encounters:   22 98.4 kg (217 lb)   10/11/14 106.6 kg (235 lb 0.2 oz)   14 101.2 kg (223 lb)   14 106.1 kg (234 lb)     Height:  5' 5\"  Body Mass Index:  Body mass index is 36.11 kg/m .  Vitals:  B/P: Data Unavailable, P: Data Unavailable, R: Data Unavailable     Starting weight in weight management with Dr. Gordon was: 217 in 3/2022    Diet Recall Review with Patient 3/16/2022   Do you typically eat breakfast? No   Do you typically eat lunch? Yes   If you do eat lunch, what types of food do you typically eat?  Salads, wraps, grain bowls   Do you typically eat supper? Yes   If you do eat supper, what types of food do you typically eat? Chicken, potatoes, salad, veggies   Do you typically eat snacks? Yes   If you do snack, what types of food do you typically eat? Almonds, corn chips, cottage cheese   Do you like vegetables?  Yes   Do you drink water? Yes   How many glasses of juice do you drink in a typical day? 0   How many of glasses of milk do you drink in a typical day? 0   If you do drink milk, what type? N/A   How many 8oz glasses of sugar containing drinks such as Schuyler-Aid/sweet tea do you drink in a day? 0   How many cans/bottles of sugar pop/soda/tea/sports drinks do you drink in a day? 0   How many cans/bottles of diet pop/soda/tea or sports drink do you drink in a day? 0   How often do you have a drink of alcohol? Never "       MEDICATIONS:   Current Outpatient Medications   Medication     bromocriptine (PARLODEL) 2.5 MG tablet     buPROPion (WELLBUTRIN XL) 150 MG 24 hr tablet     levothyroxine (SYNTHROID/LEVOTHROID) 50 MCG tablet     methylphenidate (CONCERTA) 27 MG CR tablet     norgestim-eth estrad triphasic (ORTHO TRI-CYCLEN) 0.18/0.215/0.25 MG-35 MCG tablet     Pediatric Multivitamins-Fl (MULTIVITAMIN WITH 1 MG FLUORIDE) 1 MG CHEW     Semaglutide, 1 MG/DOSE, (OZEMPIC, 1 MG/DOSE,) 4 MG/3ML SOPN     vitamin D (ERGOCALCIFEROL) 91540 UNIT capsule     No current facility-administered medications for this visit.       Weight Loss Medication History Reviewed With Patient 8/3/2022   Which weight loss medications are you currently taking on a regular basis?  Ozempic, Bupropion   Are you having any side effects from the weight loss medication that we have prescribed you? No       LABS:  No results found for: A1C  No results found for: CHOL  No results found for: TSH  Creatinine   Date Value Ref Range Status   10/08/2014 0.73 0.52 - 1.04 mg/dL Final     No results found for: ALT    ASSESSMENT:  Ms. Carrizales is a 42 year old female with history of Class 3 obesity with current body mass index is 36.11 kg/m . and with current health consequences who presents for weight management follow-up consultation. Overall Larisa Carrizales is doing very well.     The following diagnoses are relevant to Larisa Carrizales's history of obesity:     PLAN:    Problem   Obesity, Unspecified Classification, Unspecified Obesity Type, Unspecified Whether Serious Comorbidity Present    March 2022: My first time meeting Ms Carrizales. We discussed starting bupropion given its beneficial effects for weight management. She will also continue semaglutide 0.5mg weekly, and then can increase to 1.0mg weekly when the weight-loss effect of 0.5mg weekly wears off. Her lifestyle is overall very good. Given her age over 40, we discussed adding in more structured strength  training when it works for her.     Aug 2022: Is down from 233 in Jan 2022 to 197 today. Ozempic is helping significantly.  Semaglutide: continue semaglutide; though soon this will be cost-prohibitive.    Bupropion: is down to 150mg per day because recently diagnosed with ADHD and is now on concerta.         No problem-specific Assessment & Plan notes found for this encounter.      No orders of the defined types were placed in this encounter.       With motivational interviewing, Larisa took part in making the following plan:  Patient Instructions   Congrats on 36 pounds, 15% weight loss.     Agree with continuing     Follow-up: in 3 months       FOLLOW-UP:    12 weeks.    20 minutes spent on the date of the encounter doing chart review, history and exam, documentation and further activities as noted above.    Thank you for including me in the care of your patient.  Please do not hesitate to call with questions or concerns.    Sincerely,    Linda Gordon MD MPH  Diplomate, American Board of Obesity Medicine, American Board of Internal Medicine, American Board of Pediatrics    Departments of Internal Medicine and Pediatrics  [facility]        [unfilled]       Larisa is a 42 year old who is being evaluated via a billable video visit.      How would you like to obtain your AVS? MyChart  If the video visit is dropped, the invitation should be resent by: [phone]  Will anyone else be joining your video visit? No    During this virtual visit the patient is located in [place], patient verifies this as the location during the entirety of this visit.     Video-Visit Details    Video Start Time: 2:47 PM    Type of service:  Video Visit    Video End Time:3:13 PM    Originating Location (pt. Location): Home    Distant Location (provider location):  [facility] [place]     Platform used for Video Visit: [employer]

## 2022-08-09 ENCOUNTER — TELEPHONE (OUTPATIENT)
Dept: PEDIATRICS | Facility: CLINIC | Age: 42
End: 2022-08-09

## 2022-08-13 RX ORDER — SEMAGLUTIDE 1.34 MG/ML
1 INJECTION, SOLUTION SUBCUTANEOUS WEEKLY
Qty: 12 ML | Refills: 3 | Status: SHIPPED | OUTPATIENT
Start: 2022-08-13

## 2022-08-13 RX ORDER — BUPROPION HYDROCHLORIDE 150 MG/1
150 TABLET ORAL EVERY MORNING
Qty: 30 TABLET | Refills: 0 | Status: SHIPPED | OUTPATIENT
Start: 2022-08-13

## 2022-08-15 ENCOUNTER — TELEPHONE (OUTPATIENT)
Dept: ENDOCRINOLOGY | Facility: CLINIC | Age: 42
End: 2022-08-15

## 2022-08-15 NOTE — TELEPHONE ENCOUNTER
"Prior Authorization Retail Medication Request    Medication/Dose: Ozempic    ICD code (if different than what is on RX):      Previously Tried and Failed: Watching Portions or Calories, Exercise     Rationale: Hypothyroidism, Depression, Fatigue   Weight loss medication(s) are indicated due to patient having a BMI of at least 30 kg/m2     Estimated body mass index is 36.11 kg/m  as calculated from the following:    Height as of 8/4/22: 1.651 m (5' 5\").    Weight as of 3/17/22: 98.4 kg (217 lb).    Initial Weight:  217 lbs 0 oz    Insurance Name:    Insurance ID:        Pharmacy Information (if different than what is on RX)  Name: [id number] [address] AT [facility]    Phone:  [phone]  "

## 2022-08-17 NOTE — TELEPHONE ENCOUNTER
PA Initiation    Medication: Ozempic  Insurance Company: [employer] - Phone [phone] Fax [phone]  Pharmacy Filling the Rx:    Filling Pharmacy Phone:    Filling Pharmacy Fax:    Start Date: 8/16/2022   Updated with current Insurance and finished initation

## 2022-08-31 NOTE — TELEPHONE ENCOUNTER
Prior Authorization Approval    Authorization Effective Date:    Authorization Expiration Date:    Medication: Ozempic-Approved  Approved Dose/Quantity:   Reference #: (Key: [id number])   Insurance Company: [employer] - Phone [phone] Fax [phone]  Expected CoPay:       CoPay Card Available:      Foundation Assistance Needed:    Which Pharmacy is filling the prescription (Not needed for infusion/clinic administered):    Pharmacy Notified:    Patient Notified:

## 2022-11-19 ENCOUNTER — HEALTH MAINTENANCE LETTER (OUTPATIENT)
Age: 42
End: 2022-11-19

## 2023-04-09 ENCOUNTER — HEALTH MAINTENANCE LETTER (OUTPATIENT)
Age: 43
End: 2023-04-09

## 2024-04-06 ENCOUNTER — HEALTH MAINTENANCE LETTER (OUTPATIENT)
Age: 44
End: 2024-04-06

## 2024-06-15 ENCOUNTER — HEALTH MAINTENANCE LETTER (OUTPATIENT)
Age: 44
End: 2024-06-15

## 2025-07-06 ENCOUNTER — HEALTH MAINTENANCE LETTER (OUTPATIENT)
Age: 45
End: 2025-07-06